# Patient Record
Sex: FEMALE | Race: WHITE | NOT HISPANIC OR LATINO | URBAN - METROPOLITAN AREA
[De-identification: names, ages, dates, MRNs, and addresses within clinical notes are randomized per-mention and may not be internally consistent; named-entity substitution may affect disease eponyms.]

---

## 2017-03-29 ENCOUNTER — INPATIENT HOSPITAL (OUTPATIENT)
Dept: URBAN - METROPOLITAN AREA HOSPITAL 60 | Facility: HOSPITAL | Age: 67
End: 2017-03-29
Payer: COMMERCIAL

## 2017-03-29 DIAGNOSIS — K85.00 IDIOPATHIC ACUTE PANCREATITIS WITHOUT NECROSIS OR INFECTION: ICD-10-CM

## 2017-03-29 DIAGNOSIS — R74.8 ABNORMAL LEVELS OF OTHER SERUM ENZYMES: ICD-10-CM

## 2017-03-29 DIAGNOSIS — R10.13 EPIGASTRIC PAIN: ICD-10-CM

## 2017-03-29 PROCEDURE — 99223 1ST HOSP IP/OBS HIGH 75: CPT

## 2017-03-31 ENCOUNTER — INPATIENT HOSPITAL (OUTPATIENT)
Dept: URBAN - METROPOLITAN AREA HOSPITAL 60 | Facility: HOSPITAL | Age: 67
End: 2017-03-31
Payer: COMMERCIAL

## 2017-03-31 DIAGNOSIS — K85.00 IDIOPATHIC ACUTE PANCREATITIS WITHOUT NECROSIS OR INFECTION: ICD-10-CM

## 2017-03-31 DIAGNOSIS — R74.8 ABNORMAL LEVELS OF OTHER SERUM ENZYMES: ICD-10-CM

## 2017-03-31 DIAGNOSIS — R10.13 EPIGASTRIC PAIN: ICD-10-CM

## 2017-03-31 PROCEDURE — 99233 SBSQ HOSP IP/OBS HIGH 50: CPT

## 2017-04-01 ENCOUNTER — INPATIENT HOSPITAL (OUTPATIENT)
Dept: URBAN - METROPOLITAN AREA HOSPITAL 60 | Facility: HOSPITAL | Age: 67
End: 2017-04-01
Payer: COMMERCIAL

## 2017-04-01 DIAGNOSIS — J96.00 ACUTE RESPIRATORY FAILURE, UNSPECIFIED WHETHER WITH HYPOXIA: ICD-10-CM

## 2017-04-01 DIAGNOSIS — A41.9 SEPSIS, UNSPECIFIED ORGANISM: ICD-10-CM

## 2017-04-01 DIAGNOSIS — I95.1 ORTHOSTATIC HYPOTENSION: ICD-10-CM

## 2017-04-01 DIAGNOSIS — K85.00 IDIOPATHIC ACUTE PANCREATITIS WITHOUT NECROSIS OR INFECTION: ICD-10-CM

## 2017-04-01 PROCEDURE — 99233 SBSQ HOSP IP/OBS HIGH 50: CPT

## 2017-04-02 PROCEDURE — 99233 SBSQ HOSP IP/OBS HIGH 50: CPT

## 2017-04-03 ENCOUNTER — INPATIENT HOSPITAL (OUTPATIENT)
Dept: URBAN - METROPOLITAN AREA HOSPITAL 60 | Facility: HOSPITAL | Age: 67
End: 2017-04-03
Payer: COMMERCIAL

## 2017-04-03 DIAGNOSIS — I95.1 ORTHOSTATIC HYPOTENSION: ICD-10-CM

## 2017-04-03 DIAGNOSIS — J96.00 ACUTE RESPIRATORY FAILURE, UNSPECIFIED WHETHER WITH HYPOXIA: ICD-10-CM

## 2017-04-03 DIAGNOSIS — R10.13 EPIGASTRIC PAIN: ICD-10-CM

## 2017-04-03 DIAGNOSIS — K85.00 IDIOPATHIC ACUTE PANCREATITIS WITHOUT NECROSIS OR INFECTION: ICD-10-CM

## 2017-04-03 DIAGNOSIS — A41.9 SEPSIS, UNSPECIFIED ORGANISM: ICD-10-CM

## 2017-04-03 DIAGNOSIS — R74.8 ABNORMAL LEVELS OF OTHER SERUM ENZYMES: ICD-10-CM

## 2017-04-03 PROCEDURE — 99232 SBSQ HOSP IP/OBS MODERATE 35: CPT

## 2017-04-04 PROCEDURE — 99232 SBSQ HOSP IP/OBS MODERATE 35: CPT

## 2017-04-05 PROCEDURE — 99232 SBSQ HOSP IP/OBS MODERATE 35: CPT

## 2017-04-06 PROCEDURE — 99232 SBSQ HOSP IP/OBS MODERATE 35: CPT

## 2017-04-07 PROCEDURE — 99232 SBSQ HOSP IP/OBS MODERATE 35: CPT

## 2017-04-08 ENCOUNTER — INPATIENT HOSPITAL (OUTPATIENT)
Dept: URBAN - METROPOLITAN AREA HOSPITAL 60 | Facility: HOSPITAL | Age: 67
End: 2017-04-08
Payer: COMMERCIAL

## 2017-04-08 DIAGNOSIS — J96.00 ACUTE RESPIRATORY FAILURE, UNSPECIFIED WHETHER WITH HYPOXIA: ICD-10-CM

## 2017-04-08 DIAGNOSIS — I95.1 ORTHOSTATIC HYPOTENSION: ICD-10-CM

## 2017-04-08 DIAGNOSIS — R10.13 EPIGASTRIC PAIN: ICD-10-CM

## 2017-04-08 DIAGNOSIS — R74.8 ABNORMAL LEVELS OF OTHER SERUM ENZYMES: ICD-10-CM

## 2017-04-08 DIAGNOSIS — K85.00 IDIOPATHIC ACUTE PANCREATITIS WITHOUT NECROSIS OR INFECTION: ICD-10-CM

## 2017-04-08 DIAGNOSIS — A41.9 SEPSIS, UNSPECIFIED ORGANISM: ICD-10-CM

## 2017-04-08 PROCEDURE — 99233 SBSQ HOSP IP/OBS HIGH 50: CPT

## 2017-04-09 PROCEDURE — 99232 SBSQ HOSP IP/OBS MODERATE 35: CPT

## 2017-04-10 ENCOUNTER — INPATIENT HOSPITAL (OUTPATIENT)
Dept: URBAN - METROPOLITAN AREA HOSPITAL 60 | Facility: HOSPITAL | Age: 67
End: 2017-04-10
Payer: COMMERCIAL

## 2017-04-10 DIAGNOSIS — J96.00 ACUTE RESPIRATORY FAILURE, UNSPECIFIED WHETHER WITH HYPOXIA: ICD-10-CM

## 2017-04-10 DIAGNOSIS — K85.00 IDIOPATHIC ACUTE PANCREATITIS WITHOUT NECROSIS OR INFECTION: ICD-10-CM

## 2017-04-10 DIAGNOSIS — R10.13 EPIGASTRIC PAIN: ICD-10-CM

## 2017-04-10 DIAGNOSIS — I95.1 ORTHOSTATIC HYPOTENSION: ICD-10-CM

## 2017-04-10 DIAGNOSIS — A41.9 SEPSIS, UNSPECIFIED ORGANISM: ICD-10-CM

## 2017-04-10 DIAGNOSIS — R74.8 ABNORMAL LEVELS OF OTHER SERUM ENZYMES: ICD-10-CM

## 2017-04-10 PROCEDURE — 99232 SBSQ HOSP IP/OBS MODERATE 35: CPT

## 2017-04-11 ENCOUNTER — INPATIENT HOSPITAL (OUTPATIENT)
Dept: URBAN - METROPOLITAN AREA HOSPITAL 60 | Facility: HOSPITAL | Age: 67
End: 2017-04-11
Payer: COMMERCIAL

## 2017-04-11 DIAGNOSIS — I95.1 ORTHOSTATIC HYPOTENSION: ICD-10-CM

## 2017-04-11 DIAGNOSIS — R74.8 ABNORMAL LEVELS OF OTHER SERUM ENZYMES: ICD-10-CM

## 2017-04-11 DIAGNOSIS — A41.9 SEPSIS, UNSPECIFIED ORGANISM: ICD-10-CM

## 2017-04-11 DIAGNOSIS — J96.00 ACUTE RESPIRATORY FAILURE, UNSPECIFIED WHETHER WITH HYPOXIA: ICD-10-CM

## 2017-04-11 DIAGNOSIS — K85.00 IDIOPATHIC ACUTE PANCREATITIS WITHOUT NECROSIS OR INFECTION: ICD-10-CM

## 2017-04-11 DIAGNOSIS — R10.13 EPIGASTRIC PAIN: ICD-10-CM

## 2017-04-11 PROCEDURE — 99232 SBSQ HOSP IP/OBS MODERATE 35: CPT

## 2017-05-12 ENCOUNTER — OFFICE (OUTPATIENT)
Dept: URBAN - METROPOLITAN AREA CLINIC 101 | Facility: CLINIC | Age: 67
End: 2017-05-12
Payer: COMMERCIAL

## 2017-05-12 VITALS
HEIGHT: 62 IN | TEMPERATURE: 97.9 F | HEART RATE: 88 BPM | WEIGHT: 191 LBS | SYSTOLIC BLOOD PRESSURE: 107 MMHG | DIASTOLIC BLOOD PRESSURE: 81 MMHG

## 2017-05-12 DIAGNOSIS — R93.8 ABNORMAL FINDINGS ON DIAGNOSTIC IMAGING OF OTHER SPECIFIED B: ICD-10-CM

## 2017-05-12 DIAGNOSIS — K75.0 ABSCESS OF LIVER: ICD-10-CM

## 2017-05-12 DIAGNOSIS — R93.5 ABNORMAL FINDINGS ON DIAGNOSTIC IMAGING OF OTHER ABDOMINAL R: ICD-10-CM

## 2017-05-12 PROCEDURE — 99214 OFFICE O/P EST MOD 30 MIN: CPT

## 2017-06-01 ENCOUNTER — OFFICE (OUTPATIENT)
Dept: URBAN - METROPOLITAN AREA CLINIC 101 | Facility: CLINIC | Age: 67
End: 2017-06-01
Payer: COMMERCIAL

## 2017-06-01 VITALS
DIASTOLIC BLOOD PRESSURE: 106 MMHG | SYSTOLIC BLOOD PRESSURE: 136 MMHG | HEIGHT: 62 IN | HEART RATE: 78 BPM | WEIGHT: 193 LBS | TEMPERATURE: 98.1 F

## 2017-06-01 DIAGNOSIS — K75.0 ABSCESS OF LIVER: ICD-10-CM

## 2017-06-01 DIAGNOSIS — Z12.11 ENCOUNTER FOR SCREENING FOR MALIGNANT NEOPLASM OF COLON: ICD-10-CM

## 2017-06-01 DIAGNOSIS — R93.5 ABNORMAL FINDINGS ON DIAGNOSTIC IMAGING OF OTHER ABDOMINAL R: ICD-10-CM

## 2017-06-01 DIAGNOSIS — R93.8 ABNORMAL FINDINGS ON DIAGNOSTIC IMAGING OF OTHER SPECIFIED B: ICD-10-CM

## 2017-06-01 PROCEDURE — 99214 OFFICE O/P EST MOD 30 MIN: CPT

## 2017-06-01 NOTE — SERVICEHPINOTES
JANESSA HANNHA   is a   67   year old    female who is being seen in consultation at the request of   [facility]   for liver abscess follow up.  She was hospitalized the end of March through the second week of April for liver abscesses. Ct abd/pelvis 4/2/17-numerous hypodensities in posterior right hepatic lobe largest measuring 8.4x7.9x10cm with fluid and fat stranding, moderate right lateral upper and mid abdominal wall fat stranding, no definite fluid collection, s/p cholecystectomy, no evidence of pancreatitis. She had an MRCP 4/7/2017-s/p cholecystectomy, abrupt change in the caliber of the CBD at the ampullary insertion, no filling defect or stricture, abnormal signal in the right hepatic lobe similar to CT with mild internal worsening mild abdominal ascites, moderate right lateral abdominal wall SQ edema and fluid.  Ct abd/pelvis w/ contrast 5/17/2017-right lateral lobe hypodensity measures 7.2x4.9x6.7cm, no perihepatic fluid, CBD 16mm at marcy hepatis and 13mm at head of pancreas. Labs 4/13/2017 AST-21, ALT-17, alk phos-111, total bili-050, wbc-12.6, hgb-8.5, hct-27.2. She had her PICC line removed 2 weeks ago. Initially she was on cefepime 2g and metronidazole 500mg for liver abscesses. 2 weeks ago she was switched to cefuroxime 500mg BID (to take for 30 days).   She has a follow with Dr. Felipe BLANCO in 2 weeks.  He plans to repeat the Ct scan in one month.  Denies abdominal pain, n/v, fever/chills. She has a good appetite.Her last colonoscopy was about 12 years ago in [place]. No family history of colon cancer. She has a BM a couple of times a day. Stools are BSS type 6. No blood or mucous.

## 2017-07-13 ENCOUNTER — OFFICE (OUTPATIENT)
Dept: URBAN - METROPOLITAN AREA CLINIC 101 | Facility: CLINIC | Age: 67
End: 2017-07-13
Payer: COMMERCIAL

## 2017-07-13 VITALS
WEIGHT: 194 LBS | DIASTOLIC BLOOD PRESSURE: 91 MMHG | TEMPERATURE: 97.7 F | HEIGHT: 62 IN | HEART RATE: 72 BPM | SYSTOLIC BLOOD PRESSURE: 133 MMHG

## 2017-07-13 DIAGNOSIS — R93.5 ABNORMAL FINDINGS ON DIAGNOSTIC IMAGING OF OTHER ABDOMINAL R: ICD-10-CM

## 2017-07-13 DIAGNOSIS — Z12.11 ENCOUNTER FOR SCREENING FOR MALIGNANT NEOPLASM OF COLON: ICD-10-CM

## 2017-07-13 DIAGNOSIS — K75.0 ABSCESS OF LIVER: ICD-10-CM

## 2017-07-13 PROCEDURE — 99214 OFFICE O/P EST MOD 30 MIN: CPT

## 2017-07-13 NOTE — SERVICEHPINOTES
JANESSA HANNAH   is a   67   year old    female who is being seen in consultation at the request of   [facility]   for liver abscess follow up. She was hospitalized the end of March through the second week of April for liver abscesses. Ct abd/pelvis 4/2/17-numerous hypodensities in posterior right hepatic lobe largest measuring 8.4x7.9x10cm with fluid and fat stranding, moderate right lateral upper and mid abdominal wall fat stranding, no definite fluid collection, s/p cholecystectomy, no evidence of pancreatitis. She had an MRCP 4/7/2017-s/p cholecystectomy, abrupt change in the caliber of the CBD at the ampullary insertion, no filling defect or stricture, abnormal signal in the right hepatic lobe similar to CT with mild internal worsening mild abdominal ascites, moderate right lateral abdominal wall SQ edema and fluid. Ct abd/pelvis w/ contrast 5/17/2017-right lateral lobe hypodensity measures 7.2x4.9x6.7cm, no perihepatic fluid, CBD 16mm at marcy hepatis and 13mm at head of pancreas.Labs 4/13/2017 AST-21, ALT-17, alk phos-111, total bili-050, wbc-12.6, hgb-8.5, hct-27.2Initially she was on IV cefepime 2g and IV metronidazole 500mg for liver abscesses. Over a month ago she was switched to cefuroxime 500mg BID. She has a follow with Dr. Leong-ID, she has f/u with him in 2 weeks. He plans to repeat the Ct scan in a couple of weeks. Denies abdominal pain, n/v, fever/chills. She has a good appetite.She had labs Tuesday with Dr. Leong-will obtain resultsHer last colonoscopy was about 12 years ago in [place]. No family history of colon cancer. She has a BM a couple of times a day. Stools are BSS type 4 and rarely 6. No blood or mucous.

## 2017-07-25 LAB
CBC W/O DIFF: HEMATOCRIT: 40 %
CBC W/O DIFF: HEMOGLOBIN: 12.8 GM/DL
CBC W/O DIFF: MEAN CORPUSCULAR HEMOGLOBIN: 26.5 PG — LOW
CBC W/O DIFF: MEAN CORPUSCULAR HGB CONC: 32.2 G/DL — LOW
CBC W/O DIFF: MEAN CORPUSCULAR VOLUME: 82.2 FL
CBC W/O DIFF: MEAN PLATELET VOLUME: 11.2 FL — HIGH
CBC W/O DIFF: NRBC ABSOLUTE COUNT: 0 K/MM3
CBC W/O DIFF: NRBC%: 0 /100WBC
CBC W/O DIFF: PLATELET COUNT: 237 K/MM3
CBC W/O DIFF: RED BLOOD COUNT: 4.83 M/MM3
CBC W/O DIFF: RED CELL DISTRIBUTION WIDTH #: 39 FL
CBC W/O DIFF: RED CELL DISTRIBUTION WIDTH %: 13 %
CBC W/O DIFF: WHITE BLOOD COUNT: 9.2 K/MM3
COMPREHENSIVE METABOLIC PROF: ALANINE AMINOTRANS (ALT/GPT): 46 IU/L
COMPREHENSIVE METABOLIC PROF: ALBUMIN: 3.5 GM/DL
COMPREHENSIVE METABOLIC PROF: ALK PHOS,TOTAL: 86 IU/L
COMPREHENSIVE METABOLIC PROF: ANION GAP: 7
COMPREHENSIVE METABOLIC PROF: AST/SGOT ASPARTATE AMINO TRANS: 33 U/L
COMPREHENSIVE METABOLIC PROF: BILIRUBIN,TOTAL: 0.4 MG/DL
COMPREHENSIVE METABOLIC PROF: BLOOD UREA NITROGEN: 18 MG/DL
COMPREHENSIVE METABOLIC PROF: CALCIUM LEVEL: 9.1 MG/DL
COMPREHENSIVE METABOLIC PROF: CARBON DIOXIDE: 29 MEQ/L
COMPREHENSIVE METABOLIC PROF: CHLORIDE LEVEL: 103 MEQ/L
COMPREHENSIVE METABOLIC PROF: CREATININE,SERUM: 0.62 MG/DL
COMPREHENSIVE METABOLIC PROF: GLOMERULAR FILTRATION RATE: > 60 ML/MIN
COMPREHENSIVE METABOLIC PROF: GLUCOSE,RANDOM: 142 MG/DL — HIGH
COMPREHENSIVE METABOLIC PROF: PERFORMING LOCATION: (no result)
COMPREHENSIVE METABOLIC PROF: POTASSIUM LEVEL: 4.2 MEQ/L
COMPREHENSIVE METABOLIC PROF: SODIUM LEVEL, SERUM: 139 MEQ/L
COMPREHENSIVE METABOLIC PROF: TOTAL PROTEIN: 7.9 GM/DL
HEPATIC FUNCTION (LIVER) PANEL: ALANINE AMINOTRANS (ALT/GPT): 47 IU/L
HEPATIC FUNCTION (LIVER) PANEL: ALBUMIN: 3.5 GM/DL
HEPATIC FUNCTION (LIVER) PANEL: ALK PHOS,TOTAL: 88 IU/L
HEPATIC FUNCTION (LIVER) PANEL: AST/SGOT ASPARTATE AMINO TRANS: 33 U/L
HEPATIC FUNCTION (LIVER) PANEL: BILIRUBIN,DIRECT: 0.11 MG/DL
HEPATIC FUNCTION (LIVER) PANEL: BILIRUBIN,TOTAL: 0.5 MG/DL
HEPATIC FUNCTION (LIVER) PANEL: TOTAL PROTEIN: 7.9 GM/DL

## 2022-05-06 NOTE — SERVICEHPINOTES
JANESSA HANNAH   is a   67   year old    female who is being seen in consultation at the request of   [facility]   for follow up to [facility] for liver abscesses. She was hospitalized the end of March through the second week of April for liver abscesses.  Ct abd/pelvis 4/2/17-numerous hypodensities in posterior right hepatic lobe largest measuring 8.4x7.9x10cm with fluid and fat stranding, moderate right lateral upper and mid abdominal wall fat stranding, no definite fluid collection, s/p cholecystectomy, no evidence of pancreatitis. She had an MRCP 4/7/2017-s/p cholecystectomy, abrupt change in the caliber of the CBD at the ampullary insertion, no filling defect or stricture, abnormal signal in the right hepatic lobe similar to CT with mild internal worsening mild abdominal ascites, moderate right lateral abdominal wall SQ edema and fluid. Labs 4/13/2017 AST-21, ALT-17, alk phos-111, total bili-050, wbc-12.6, hgb-8.5, hct-27.2.  She has a PICC line.  She is still on cefepime 2g  and metronidazole 500mg. She follows with Dr. Felipe BLANCO. Reports having labs with ID recently. Denies abdominal pain, n/v, fever/chills.Her last colonoscopy was about 12 years ago in [place]. No family history of colon cancer. She has a BM daily. Stools are BSS type 6. No blood or mucous present.  She had a stress test and echocardiogram recently with Dr. Nelson. 
4 = No assist / stand by assistance